# Patient Record
Sex: FEMALE | Race: WHITE | NOT HISPANIC OR LATINO | Employment: FULL TIME | URBAN - METROPOLITAN AREA
[De-identification: names, ages, dates, MRNs, and addresses within clinical notes are randomized per-mention and may not be internally consistent; named-entity substitution may affect disease eponyms.]

---

## 2019-12-21 ENCOUNTER — HOSPITAL ENCOUNTER (EMERGENCY)
Facility: HOSPITAL | Age: 24
Discharge: HOME | End: 2019-12-21
Attending: EMERGENCY MEDICINE | Admitting: EMERGENCY MEDICINE

## 2019-12-21 ENCOUNTER — APPOINTMENT (OUTPATIENT)
Dept: GENERAL RADIOLOGY | Facility: HOSPITAL | Age: 24
End: 2019-12-21

## 2019-12-21 VITALS
TEMPERATURE: 98.8 F | RESPIRATION RATE: 16 BRPM | BODY MASS INDEX: 20.46 KG/M2 | WEIGHT: 135 LBS | DIASTOLIC BLOOD PRESSURE: 73 MMHG | HEIGHT: 68 IN | HEART RATE: 74 BPM | SYSTOLIC BLOOD PRESSURE: 111 MMHG | OXYGEN SATURATION: 100 %

## 2019-12-21 DIAGNOSIS — F41.9 ANXIETY: ICD-10-CM

## 2019-12-21 DIAGNOSIS — R03.0 ELEVATED BLOOD PRESSURE, SITUATIONAL: ICD-10-CM

## 2019-12-21 DIAGNOSIS — R07.9 CHEST PAIN IN ADULT: Primary | ICD-10-CM

## 2019-12-21 LAB
D DIMER PPP FEU-MCNC: 0.26 MCGFEU/ML (ref 0–0.56)
HOLD SPECIMEN: NORMAL
HOLD SPECIMEN: NORMAL
TROPONIN T SERPL-MCNC: <0.01 NG/ML (ref 0–0.03)
TSH SERPL DL<=0.05 MIU/L-ACNC: 3.41 UIU/ML (ref 0.27–4.2)
WHOLE BLOOD HOLD SPECIMEN: NORMAL
WHOLE BLOOD HOLD SPECIMEN: NORMAL

## 2019-12-21 PROCEDURE — 93005 ELECTROCARDIOGRAM TRACING: CPT | Performed by: EMERGENCY MEDICINE

## 2019-12-21 PROCEDURE — 99283 EMERGENCY DEPT VISIT LOW MDM: CPT

## 2019-12-21 PROCEDURE — 71045 X-RAY EXAM CHEST 1 VIEW: CPT

## 2019-12-21 PROCEDURE — 93005 ELECTROCARDIOGRAM TRACING: CPT

## 2019-12-21 PROCEDURE — 84443 ASSAY THYROID STIM HORMONE: CPT | Performed by: EMERGENCY MEDICINE

## 2019-12-21 PROCEDURE — 85379 FIBRIN DEGRADATION QUANT: CPT | Performed by: EMERGENCY MEDICINE

## 2019-12-21 PROCEDURE — 84484 ASSAY OF TROPONIN QUANT: CPT | Performed by: EMERGENCY MEDICINE

## 2019-12-21 RX ORDER — MELOXICAM 15 MG/1
15 TABLET ORAL DAILY
Qty: 10 TABLET | Refills: 0 | Status: SHIPPED | OUTPATIENT
Start: 2019-12-21

## 2019-12-21 RX ORDER — FAMOTIDINE 20 MG/1
20 TABLET, FILM COATED ORAL 2 TIMES DAILY
Qty: 14 TABLET | Refills: 0 | Status: SHIPPED | OUTPATIENT
Start: 2019-12-21

## 2019-12-22 NOTE — ED PROVIDER NOTES
Subjective   Ms. Radha Roque is a 24 y.o. female who presents to the ED with c/o chest pain. She reports the pain onset 3 days ago and has been intermittent since. She describes the episodes of chest pain as dull and throbbing and rated 3/10 in severity. The episodes last 30 minutes at the most before resolving. She also complains of left leg pain but no swelling and denies fever or chills. She notes she recently traveled to [place]. She denies a family history of heart disease. There are no other acute symptoms at this time.      History provided by:  Patient  Chest Pain   Pain location:  L chest  Pain quality: dull and throbbing    Pain radiates to:  Does not radiate  Pain severity:  Mild  Onset quality:  Sudden  Duration:  3 days  Timing:  Intermittent  Progression:  Unchanged  Chronicity:  New  Relieved by:  None tried  Worsened by:  Nothing  Ineffective treatments:  None tried  Associated symptoms: no fever and no lower extremity edema        Review of Systems   Constitutional: Negative for chills and fever.   Cardiovascular: Positive for chest pain. Negative for leg swelling.   Musculoskeletal: Positive for myalgias.        Positive for left leg pain.   All other systems reviewed and are negative.      Past Medical History:   Diagnosis Date   • Heart murmur        Allergies   Allergen Reactions   • Shellfish-Derived Products Rash       Past Surgical History:   Procedure Laterality Date   • TONSILLECTOMY         History reviewed. No pertinent family history.    Social History     Socioeconomic History   • Marital status: Single     Spouse name: Not on file   • Number of children: Not on file   • Years of education: Not on file   • Highest education level: Not on file   Tobacco Use   • Smoking status: Never Smoker   • Smokeless tobacco: Never Used   Substance and Sexual Activity   • Alcohol use: Yes     Comment: rarely   • Drug use: Never         Objective   Physical Exam   Constitutional: She is oriented to  person, place, and time. She appears well-developed and well-nourished. No distress.   Anxious   HENT:   Head: Normocephalic and atraumatic.   Nose: Nose normal.   Eyes: Conjunctivae are normal. No scleral icterus.   Neck: Normal range of motion. Neck supple.   Cardiovascular: Regular rhythm, normal heart sounds and intact distal pulses.   No murmur heard.  Mildly tachycardic   Pulmonary/Chest: Effort normal and breath sounds normal. No respiratory distress. She has no wheezes. She has no rales.   Abdominal: Soft. There is no tenderness.   Musculoskeletal: Normal range of motion. She exhibits no edema.        Right lower leg: Normal. She exhibits no tenderness and no edema.        Left lower leg: Normal. She exhibits no tenderness and no edema.   Neurological: She is alert and oriented to person, place, and time.   Skin: Skin is warm and dry.   Psychiatric: She has a normal mood and affect. Her behavior is normal.   Nursing note and vitals reviewed.      Procedures         ED Course  ED Course as of Dec 22 0144   Sat Dec 21, 2019   2220 Troponin T: <0.010 [RS]   2220 TSH Baseline: 3.410 [RS]   2312 D-Dimer, Quant: 0.26 [RS]   2312 No emergent findings on her evaluation here in the emergency department.  Cardiac and PE labs are within normal range.  EKG demonstrates sinus tachycardia but no ischemic changes.  Chest x-ray is clear. I had a discussion with the patient/family regarding diagnosis, diagnostic results, treatment plan, and medications.  The patient/family indicated understanding of these instructions.  I spent adequate time at the bedside proceeding discharge necessary to personally discuss the aftercare instructions, giving patient education, providing explanations of the results of our evaluations/findings, and my decision making to assure that the patient/family understand the plan of care.  Time was allotted to answer questions at that time and throughout the ED course.  Emphasis was placed on timely  follow-up after discharge.  I also discussed the potential for the development of an acute emergent condition requiring further evaluation, admission, or even surgical intervention. I discussed that we found nothing during the visit today indicating the need for further workup, admission, or the presence of an unstable medical condition.  I encouraged the patient to return to the emergency department immediately for ANY concerns, worsening, new complaints, or if symptoms persist and unable to seek follow-up in a timely fashion.  The patient/family expressed understanding and agreement with this plan.     [RS]      ED Course User Index  [RS] Larry Coles MD     Recent Results (from the past 24 hour(s))   Light Blue Top    Collection Time: 12/21/19  9:14 PM   Result Value Ref Range    Extra Tube hold for add-on    Green Top (Gel)    Collection Time: 12/21/19  9:14 PM   Result Value Ref Range    Extra Tube Hold for add-ons.    Lavender Top    Collection Time: 12/21/19  9:14 PM   Result Value Ref Range    Extra Tube hold for add-on    Gold Top - SST    Collection Time: 12/21/19  9:14 PM   Result Value Ref Range    Extra Tube Hold for add-ons.    TSH    Collection Time: 12/21/19  9:14 PM   Result Value Ref Range    TSH 3.410 0.270 - 4.200 uIU/mL   D-dimer, Quantitative    Collection Time: 12/21/19  9:14 PM   Result Value Ref Range    D-Dimer, Quantitative 0.26 0.00 - 0.56 MCGFEU/mL   Troponin    Collection Time: 12/21/19  9:14 PM   Result Value Ref Range    Troponin T <0.010 0.000 - 0.030 ng/mL     Note: In addition to lab results from this visit, the labs listed above may include labs taken at another facility or during a different encounter within the last 24 hours. Please correlate lab times with ED admission and discharge times for further clarification of the services performed during this visit.    XR Chest 1 View   Final Result   No acute cardiopulmonary findings.      Signer Name: Kd Hernandez MD     "Signed: 12/21/2019 10:16 PM    Workstation Name: [id number]     [facility]        Vitals:    12/21/19 2044 12/21/19 2145 12/21/19 2300 12/21/19 2315   BP: 144/81 119/73 114/67 111/73   BP Location: Left arm      Patient Position: Sitting      Pulse: (!) 127 98 86 74   Resp: 16      Temp: 98.8 °F (37.1 °C)      TempSrc: Oral      SpO2: 100% 99% 100% 100%   Weight: 61.2 kg (135 lb)      Height: 172.7 cm (68\")        Medications - No data to display  ECG/EMG Results (last 24 hours)     Procedure Component Value Units Date/Time    ECG 12 Lead [[id number]] Collected:  12/21/19 2056     Updated:  12/21/19 2136    Narrative:       Test Reason : chest pain  Blood Pressure : **/** mmHG  Vent. Rate : 135 BPM     Atrial Rate : 127 BPM     P-R Int : 000 ms          QRS Dur : 076 ms      QT Int : 366 ms       P-R-T Axes : 000 101 014 degrees     QTc Int : 549 ms    sinus tachycardia  Rightward axis    Abnormal ECG  No previous ECGs available  Confirmed by HALLIE COHEN MD ([id number]) on 12/21/2019 9:35:57 PM    Referred By:  ED MD           Confirmed By:HALLIE COHEN MD        ECG 12 Lead   Final Result   Test Reason : chest pain   Blood Pressure : **/** mmHG   Vent. Rate : 135 BPM     Atrial Rate : 127 BPM      P-R Int : 000 ms          QRS Dur : 076 ms       QT Int : 366 ms       P-R-T Axes : 000 101 014 degrees      QTc Int : 549 ms      sinus tachycardia   Rightward axis      Abnormal ECG   No previous ECGs available   Confirmed by HALLIE COHEN MD ([id number]) on 12/21/2019 9:35:57 PM      Referred By:  ED MD           Confirmed By:HALLIE COHEN MD                          MDM  Number of Diagnoses or Management Options  Anxiety:   Chest pain in adult:   Elevated blood pressure, situational:      Amount and/or Complexity of Data Reviewed  Clinical lab tests: reviewed  Tests in the radiology section of CPT®: reviewed  Independent visualization of images, tracings, or specimens: yes        Final diagnoses:   Chest " pain in adult   Elevated blood pressure, situational   Anxiety       Documentation assistance provided by morelia Auguste.  Information recorded by the scribe was done at my direction and has been verified and validated by me.     Alfred Auguste  12/21/19 6743       Larry Coles MD  12/22/19 1137

## 2022-01-31 ENCOUNTER — TELEPHONE (OUTPATIENT)
Dept: OTHER | Facility: HOSPITAL | Age: 27
End: 2022-01-31

## 2022-01-31 NOTE — TELEPHONE ENCOUNTER
Attempted to call pt to schedule genetics phone consult and take family history.  She didn't answer.  Left voicemail asking her to return my call.

## 2022-02-18 ENCOUNTER — CLINICAL SUPPORT (OUTPATIENT)
Dept: OTHER | Facility: HOSPITAL | Age: 27
End: 2022-02-18

## 2022-02-18 DIAGNOSIS — Z80.3 FAMILY HISTORY OF BREAST CANCER: ICD-10-CM

## 2022-02-18 DIAGNOSIS — Z13.79 GENETIC TESTING: Primary | ICD-10-CM

## 2022-02-18 NOTE — PROGRESS NOTES
Radha Roque, a 26-year-old female, was referred for genetic counseling due to a family history of breast cancer as well as an identified CHEK2 mutation (c.190G>A) in her mother. Ms. Roque has no personal history of cancer. Ms. Roque was 13 years old at menarche and is nulliparous. Her current cancer screening includes an annual clinical breast exam. Ms. Roque was interested in discussing her risk to have inherited this familial CHEK2 mutation and opted to pursue testing via the CancerNext panel through [facility]. This panel evaluates 36 genes associated with hereditary cancer. A saliva kit will be mailed to Ms. Roque’s home. Results are expected 2-3 weeks from when the lab receives the sample.     PERTINENT FAMILY HISTORY:   Mother:   Breast cancer, 47     CHEK2+  MGM:   Breast cancer, 60s  PGM:   Breast cancer, 70s    RISK ASSESSMENT:  NCCN criteria states that an individual with a relative who has a germline mutation in a gene associated with hereditary cancer may consider genetic testing. Therefore, Ms. Roque clearly meets NCCN guidelines criteria for testing for this familial CHEK2 mutation.  She would be at a 50% chance to have inherited this mutation. We discussed the option of pursuing testing via a multi-gene panel, due to the paternal family history of breast cancer. Ms. Roque opted to pursue testing via the CancerNext panel through [facility]. If genetic testing is negative, management should be guided by a family history- and personal history-based risk assessment.    GENETIC COUNSELING (30 minutes): We reviewed the family history information in detail.  Cases of cancer follow three general patterns: sporadic, familial, and hereditary.  While most breast cancer is sporadic, some cases appear to occur in family clusters.  These cases are said to be familial and account for 10-20% of breast cancer cases.  Familial cases may be due to a combination of shared genes and environmental factors among  family members.  In even fewer families, the cancer is said to be inherited, and the genes responsible for the cancer are known.      Family histories typical of hereditary cancer syndromes usually include multiple first- and second-degree relatives diagnosed with cancer types that define a syndrome.  These cases tend to be diagnosed at younger-than-expected ages and can be bilateral or multifocal.  The cancer in these families follows an autosomal dominant inheritance pattern, which indicates the likely presence of a mutation in a cancer susceptibility gene.  Children and siblings of an individual believed to carry this mutation have a 50% chance of inheriting that mutation, thereby inheriting the increased risk to develop cancer.  These mutations can be passed down from the maternal or the paternal lineage.    Hereditary breast cancer accounts for 5-10% of all cases of breast cancer. A significant proportion of hereditary breast cancer can be attributed to mutations in the BRCA1 and BRCA2 genes, however there are other genes known to contribute to cancer risk. Ms. Roque’s mother was reported to have had genetic testing which identified a CHEK2 mutation. Mutations in CHEK2 have been estimated to increase the risk of female breast cancer to somewhere between 24-48%, dependent on the family history.  Some studies have suggested a possible increased risk for colorectal cancer for men and women with a CHEK2 mutation. The lifetime risk for colorectal cancer is estimated to be up to 9.5% compared to the average risk of 3-4%. Men have been shown to have an increased lifetime risk to develop prostate cancer. Some studies have described a possible increased risk for a wide range of cancers in patients with CHEK2 mutations, however these studies are not conclusive and there are no medical management guidelines to address these possible risks at this time.    There are other genes associated with an increased risk for breast  cancer and other cancers. Due to her personal interest, Ms. Roque opted to pursue testing via the CancerNext panel through [facility].     GENETIC TESTING:  The risks, benefits and limitations of genetic testing and implications for clinical management following testing were reviewed.  DNA test results can influence decisions regarding screening, prevention and surgical management.  Genetic testing can have significant psychological implications for both individuals and families.  Also discussed was the possibility of employment and insurance discrimination based on genetic test results and the laws in place to prevent this. The implications of a positive or negative test result were discussed.    We discussed panel testing, which would involve testing 36 genes associated with increased cancer risk. The implications of a positive or negative test result were discussed. A negative result for the familial CHEK2 mutation would be considered a true negative. We discussed the possibility that, in some cases, genetic test results may be ambiguous due to the identification of a genetic variant. These variants may or may not be associated with an increased cancer risk. With multigene panel testing, it is not uncommon for a variant of uncertain significance (VUS) to be identified.  If a VUS is identified, testing family members is typically not recommended and screening recommendations are made based on the family history.  The laboratories that perform genetic testing work to reclassify the VUS and send out an amended report if and when a VUS is reclassified.  The majority of variant findings are ultimately reclassified to a negative result..     PLAN:  Genetic testing was ordered via the CancerNext panel. A saliva kit will be mailed to Ms. Roque’s home. Results are expected 2-3 weeks from when the lab receives the sample and we will contact Ms. Roque once results are received. She is welcome to contact us in the  meantime with any questions or concerns.     Linda Peters MS, [facility] [place], [facility]        Licensed Certified Genetic Counselor

## 2022-03-03 ENCOUNTER — TELEPHONE (OUTPATIENT)
Dept: OTHER | Facility: HOSPITAL | Age: 27
End: 2022-03-03

## 2022-03-03 NOTE — TELEPHONE ENCOUNTER
Spoke with pt regarding her test kit for the genetic testing that was discussed during her phone visit on 2/18/22.  Asked if she had received her kit or was having any problems with collecting sample as it appears the lab hadn't received anything from her just yet.  She stated she had received the kit but hadn't collected the sample yet.  She has been out of town but plans to send it in soon.  Told her she could contact us or the performing lab if she had any questions about collecting the sample.  Asked that she contact us to let us know when she had sent the sample in.  She understood what was discussed and was agreeable with this plan.

## 2022-04-01 ENCOUNTER — DOCUMENTATION (OUTPATIENT)
Dept: OTHER | Facility: HOSPITAL | Age: 27
End: 2022-04-01

## 2022-04-01 NOTE — PROGRESS NOTES
Radha Roque, a 26-year-old female, was referred for genetic counseling due to a family history of breast cancer as well as an identified CHEK2 mutation (c.190G>A) in her mother. Genetic counseling was provided by telephone. Ms. Roque has no personal history of cancer. Ms. Roque was 13 years old at menarche and is nulliparous. Her current cancer screening includes an annual clinical breast exam. Ms. Roque was interested in discussing her risk to have inherited this familial CHEK2 mutation and opted to pursue testing via the CancerNext panel through [facility]. This panel evaluates 36 genes associated with hereditary cancer. A saliva kit will be mailed to Ms. Roque’s home. Genetic testing was negative for any known pathogenic/deleterious mutations in the genes on this panel. Results were discussed with the patient via telephone on 04/01/2022.    PERTINENT FAMILY HISTORY:   Mother:   Breast cancer, 47     CHEK2+  MGM:   Breast cancer, 60s  PGM:   Breast cancer, 70s    RISK ASSESSMENT:  NCCN criteria states that an individual with a relative who has a germline mutation in a gene associated with hereditary cancer may consider genetic testing. Therefore, Ms. Roque clearly meets NCCN guidelines criteria for testing for this familial CHEK2 mutation.  She would be at a 50% chance to have inherited this mutation. We discussed the option of pursuing testing via a multi-gene panel, due to the paternal family history of breast cancer. Ms. Roque opted to pursue testing via the CancerNext through [facility]. If genetic testing is negative, management should be guided by a family history- and personal history-based risk assessment.    At this time, Ms. Roque’s lifetime risk of developing breast cancer should be assessed using family history risk assessment models. Using the Tyrer-Cuzick, v8 risk model Ms. Roque’s lifetime risk for breast cancer was estimated to be 18.6%, above the average 24-year-old woman’s risk of 11.2%.   This risk should be recalculated if there are changes to the patient’s family history.  Per NCCN guidelines, a lifetime risk greater than 20% is considered high risk and warrants increased screening; Ms. Roque does not fall into this category. These risk assessments are based on the family history information provided at the time of the appointment.  The assessments could change in the future should new information be obtained.      GENETIC COUNSELING: We reviewed the family history information in detail.  Cases of cancer follow three general patterns: sporadic, familial, and hereditary.  While most breast cancer is sporadic, some cases appear to occur in family clusters.  These cases are said to be familial and account for 10-20% of breast cancer cases.  Familial cases may be due to a combination of shared genes and environmental factors among family members.  In even fewer families, the cancer is said to be inherited, and the genes responsible for the cancer are known.      Family histories typical of hereditary cancer syndromes usually include multiple first- and second-degree relatives diagnosed with cancer types that define a syndrome.  These cases tend to be diagnosed at younger-than-expected ages and can be bilateral or multifocal.  The cancer in these families follows an autosomal dominant inheritance pattern, which indicates the likely presence of a mutation in a cancer susceptibility gene.  Children and siblings of an individual believed to carry this mutation have a 50% chance of inheriting that mutation, thereby inheriting the increased risk to develop cancer.  These mutations can be passed down from the maternal or the paternal lineage.    Hereditary breast cancer accounts for 5-10% of all cases of breast cancer. A significant proportion of hereditary breast cancer can be attributed to mutations in the BRCA1 and BRCA2 genes, however there are other genes known to contribute to cancer risk. Ms. Roque’s  mother had genetic testing which identified a CHEK2 mutation. Mutations in CHEK2 have been estimated to increase the risk of female breast cancer to somewhere between 24-48%, dependent on the family history.  Some studies have suggested a possible increased risk for colorectal cancer for men and women with a CHEK2 mutation. The lifetime risk for colorectal cancer is estimated to be up to 9.5% compared to the average risk of 3-4%. Men have been shown to have an increased lifetime risk to develop prostate cancer. Some studies have described a possible increased risk for a wide range of cancers in patients with CHEK2 mutations, however these studies are not conclusive and there are no medical management guidelines to address these possible risks at this time.    There are other genes associated with an increased risk for breast cancer and other cancers. Due to her personal interest, Ms. Roque opted to pursue testing via the CancerNext panel through [facility].     GENETIC TESTING:  The risks, benefits and limitations of genetic testing and implications for clinical management following testing were reviewed.  DNA test results can influence decisions regarding screening, prevention and surgical management.  Genetic testing can have significant psychological implications for both individuals and families.  Also discussed was the possibility of employment and insurance discrimination based on genetic test results and the laws in place to prevent this. The implications of a positive or negative test result were discussed.    We discussed panel testing, which would involve testing 36 genes associated with increased cancer risk. The implications of a positive or negative test result were discussed. A negative result for the familial CHEK2 mutation would be considered a true negative. We discussed the possibility that, in some cases, genetic test results may be ambiguous due to the identification of a genetic variant.  These variants may or may not be associated with an increased cancer risk. With multigene panel testing, it is not uncommon for a variant of uncertain significance (VUS) to be identified.  If a VUS is identified, testing family members is typically not recommended and screening recommendations are made based on the family history.  The laboratories that perform genetic testing work to reclassify the VUS and send out an amended report if and when a VUS is reclassified.  The majority of variant findings are ultimately reclassified to a negative result..     TEST RESULTS: Genetic testing was negative for known deleterious mutations by sequencing and rearrangement testing of the 36 genes on the [facility] [employer] [facility] panel.  This negative result greatly lowers the risk of a hereditary cancer syndrome for Ms. Roque. Other family members could still consider genetic testing due to the possibility that the cancers in her family could be due to a genetic mutation that she did not inherit.    CANCER PREVENTION: Options available to individuals with an elevated lifetime risk for breast cancer were discussed. Based on computer modeling, Ms. Roque’s lifetime risk for breast cancer would not be considered “high risk”. General population screening guidelines include self-breast exams, annual clinical breast exam, and annual mammography starting at age 40. This assessment is based on the information provided at the time of the consultation.     PLAN:  Genetic counseling remains available to Ms. Roque. If she has any questions or concerns in the future she is welcome to call our Genetics team at [phone].      Linda Peters, MS, [facility] [place]  Licensed Certified Genetic Counselor    Cc: Radha Traylor          Cc: Radha Roque

## 2023-07-18 ENCOUNTER — HOSPITAL ENCOUNTER (EMERGENCY)
Facility: HOSPITAL | Age: 28
Discharge: HOME | End: 2023-07-19
Attending: EMERGENCY MEDICINE | Admitting: EMERGENCY MEDICINE
Payer: COMMERCIAL

## 2023-07-18 ENCOUNTER — APPOINTMENT (OUTPATIENT)
Dept: GENERAL RADIOLOGY | Facility: HOSPITAL | Age: 28
End: 2023-07-18
Payer: COMMERCIAL

## 2023-07-18 DIAGNOSIS — K59.00 CONSTIPATION, UNSPECIFIED CONSTIPATION TYPE: Primary | ICD-10-CM

## 2023-07-18 PROCEDURE — 99284 EMERGENCY DEPT VISIT MOD MDM: CPT

## 2023-07-18 PROCEDURE — 74019 RADEX ABDOMEN 2 VIEWS: CPT

## 2023-07-18 RX ORDER — ONDANSETRON 4 MG/1
4 TABLET, FILM COATED ORAL ONCE
Status: COMPLETED | OUTPATIENT
Start: 2023-07-19 | End: 2023-07-19

## 2023-07-18 RX ORDER — IBUPROFEN 600 MG/1
600 TABLET ORAL ONCE
Status: COMPLETED | OUTPATIENT
Start: 2023-07-19 | End: 2023-07-19

## 2023-07-19 VITALS
BODY MASS INDEX: 21.22 KG/M2 | HEART RATE: 91 BPM | OXYGEN SATURATION: 100 % | HEIGHT: 68 IN | RESPIRATION RATE: 16 BRPM | TEMPERATURE: 99 F | SYSTOLIC BLOOD PRESSURE: 124 MMHG | WEIGHT: 140 LBS | DIASTOLIC BLOOD PRESSURE: 78 MMHG

## 2023-07-19 PROCEDURE — 63710000001 ONDANSETRON PER 8 MG: Performed by: EMERGENCY MEDICINE

## 2023-07-19 RX ADMIN — IBUPROFEN 600 MG: 600 TABLET ORAL at 00:22

## 2023-07-19 RX ADMIN — ONDANSETRON HYDROCHLORIDE 4 MG: 4 TABLET, FILM COATED ORAL at 00:22

## 2023-07-19 NOTE — ED PROVIDER NOTES
Subjective   History of Present Illness  28 year old female presents to the emergency department accompanied by her significant other with concerns about constipation. Patient reports she has  not had a bowel movement in approximately 5 days. She states she tried a Fleet's enema without relief, and reports the sensation that she needs to have a bowel movement, but reports rectal pain and feeling that the stool is stuck and unable to pass. She denies recent vomiting or new medications. She reports recent nausea and abdominal distention and bloating. She tried milk of magnesia without relief. She has not tried Miralax. She denies recent dietary changes. She started Buspar 3 or 4 weeks ago. She denies prior abdominal surgeries. She denies acute urinary symptoms such as urinary retention or dysuria, but has some urinary frequency recently.    Review of Systems   Constitutional:  Negative for diaphoresis and fever.   Eyes:  Negative for photophobia and discharge.   Respiratory:  Negative for stridor.    Gastrointestinal:  Positive for abdominal distention, constipation and nausea. Negative for diarrhea and vomiting.   Genitourinary:         Denies possibility of pregnancy.   Neurological:  Negative for speech difficulty.     Past Medical History:   Diagnosis Date    Heart murmur        Allergies   Allergen Reactions    Shellfish-Derived Products Rash       Past Surgical History:   Procedure Laterality Date    TONSILLECTOMY         History reviewed. No pertinent family history.    Social History     Socioeconomic History    Marital status: Single   Tobacco Use    Smoking status: Never    Smokeless tobacco: Never   Substance and Sexual Activity    Alcohol use: Yes     Comment: rarely    Drug use: Never           Objective   Physical Exam  Vitals and nursing note reviewed.   Constitutional:       Appearance: She is not diaphoretic.      Comments: Appears uncomfortable.   Pulmonary:      Effort: Pulmonary effort is normal.       Breath sounds: No stridor.   Abdominal:      Palpations: Abdomen is soft.      Tenderness: There is no abdominal tenderness. There is no guarding or rebound.      Comments: Mild abdominal distention.   Genitourinary:     Comments: No anal mass or fissure. Normal tone. Nontender. No gross blood. Soft brown stool up high in the rectum which is barely reachable by finger tip. No large hard fecal impaction appreciated to distal rectum.  Skin:     General: Skin is warm and dry.      Coloration: Skin is not cyanotic.   Neurological:      Mental Status: She is alert.      Comments: Normal speech, no dysarthria.                  ED Course  ED Course as of 07/19/23 0148   Tue Jul 18, 2023   2352 Patient denies possibility of pregnancy [LD]   Wed Jul 19, 2023   0147 Patient had 2 large bowel movements after soapsuds enema and is feeling much better.  She had some nausea and dry heaves, Zofran was given.  Results of x-ray discussed with patient.  All questions addressed. [LD]      ED Course User Index  [LD] Becca Hester MD                                           Medical Decision Making  Differential diagnosis includes constipation, no evidence of fecal impaction, low clinical suspicion for bowel obstruction or ileus. Low clinical suspicion for electrolyte abnormality. Does not appear clinically dehydrated.     Problems Addressed:  Constipation, unspecified constipation type: complicated acute illness or injury    Amount and/or Complexity of Data Reviewed  Radiology: ordered. Decision-making details documented in ED Course.    Risk  Prescription drug management.    No results found for this or any previous visit (from the past 24 hour(s)).  Note: In addition to lab results from this visit, the labs listed above may include labs taken at another facility or during a different encounter within the last 24 hours. Please correlate lab times with ED admission and discharge times for further clarification of the services  "performed during this visit.    XR Abdomen Flat & Upright   Final Result   Large stool burden, compatible with constipation.      Intrauterine device projects over the pelvis.      Electronically signed by:  Dai Sebastian D.O.     7/18/2023 10:32 PM Mountain Time        Vitals:    07/18/23 2058 07/19/23 0032   BP: 132/93 124/78   BP Location: Right arm    Patient Position: Sitting    Pulse: 112 91   Resp: 16    Temp: 99 °F (37.2 °C)    TempSrc: Oral    SpO2: 99% 100%   Weight: 63.5 kg (140 lb)    Height: 172.7 cm (68\")      Medications   ibuprofen (ADVIL,MOTRIN) tablet 600 mg (600 mg Oral Given 7/19/23 0022)   ondansetron (ZOFRAN) tablet 4 mg (4 mg Oral Given 7/19/23 0022)     ECG/EMG Results (last 24 hours)       ** No results found for the last 24 hours. **          No orders to display           Final diagnoses:   Constipation, unspecified constipation type       ED Disposition  ED Disposition       ED Disposition   Discharge    Condition   Stable    Comment   --               PATIENT CONNECTION - [facility] [id number]  [phone]  Schedule an appointment as soon as possible for a visit in 1 week  Call to establish care with primary care provider in our system, or you can follow-up with your primary care provider of choice.         Medication List      No changes were made to your prescriptions during this visit.            Becca Hester MD  07/20/23 1556    "

## 2023-07-19 NOTE — DISCHARGE INSTRUCTIONS
Take MiraLAX over-the-counter once daily as directed on package until stool is consistency of soft serve ice cream.